# Patient Record
Sex: MALE | Race: WHITE | URBAN - METROPOLITAN AREA
[De-identification: names, ages, dates, MRNs, and addresses within clinical notes are randomized per-mention and may not be internally consistent; named-entity substitution may affect disease eponyms.]

---

## 2021-01-15 ENCOUNTER — OFFICE VISIT (OUTPATIENT)
Dept: URBAN - METROPOLITAN AREA TELEHEALTH 2 | Facility: TELEHEALTH | Age: 51
End: 2021-01-15

## 2021-01-25 ENCOUNTER — TELEPHONE ENCOUNTER (OUTPATIENT)
Dept: URBAN - METROPOLITAN AREA CLINIC 6 | Facility: CLINIC | Age: 51
End: 2021-01-25

## 2021-01-28 ENCOUNTER — LAB OUTSIDE AN ENCOUNTER (OUTPATIENT)
Dept: URBAN - METROPOLITAN AREA TELEHEALTH 2 | Facility: TELEHEALTH | Age: 51
End: 2021-01-28

## 2021-01-28 ENCOUNTER — OFFICE VISIT (OUTPATIENT)
Dept: URBAN - METROPOLITAN AREA TELEHEALTH 2 | Facility: TELEHEALTH | Age: 51
End: 2021-01-28
Payer: COMMERCIAL

## 2021-01-28 DIAGNOSIS — K70.30 ALCOHOLIC CIRRHOSIS: ICD-10-CM

## 2021-01-28 DIAGNOSIS — N17.9 AKI (ACUTE KIDNEY INJURY): ICD-10-CM

## 2021-01-28 DIAGNOSIS — R17 JAUNDICE: ICD-10-CM

## 2021-01-28 DIAGNOSIS — N17.8 OTHER ACUTE KIDNEY FAILURE: ICD-10-CM

## 2021-01-28 DIAGNOSIS — D64.9 ANEMIA: ICD-10-CM

## 2021-01-28 DIAGNOSIS — R18.8 ASCITES: ICD-10-CM

## 2021-01-28 DIAGNOSIS — Z79.899 HIGH RISK MEDICATION USE: ICD-10-CM

## 2021-01-28 DIAGNOSIS — K70.11 ALCOHOLIC HEPATITIS WITH ASCITES: ICD-10-CM

## 2021-01-28 DIAGNOSIS — D75.89 MACROCYTOSIS: ICD-10-CM

## 2021-01-28 DIAGNOSIS — Z71.89 VACCINE COUNSELING: ICD-10-CM

## 2021-01-28 DIAGNOSIS — R20.0 NUMBNESS: ICD-10-CM

## 2021-01-28 DIAGNOSIS — Z71.6 TOBACCO ABUSE COUNSELING: ICD-10-CM

## 2021-01-28 PROCEDURE — 99205 OFFICE O/P NEW HI 60 MIN: CPT

## 2021-01-28 PROCEDURE — 99255 IP/OBS CONSLTJ NEW/EST HI 80: CPT

## 2021-01-28 RX ORDER — NICOTINE 7MG/24HR
AS DIRECTED PATCH, TRANSDERMAL 24 HOURS TRANSDERMAL ONCE A DAY
Status: DISCONTINUED | COMMUNITY

## 2021-01-28 RX ORDER — FUROSEMIDE 20 MG/1
1 TABLET TABLET ORAL BID
Status: ACTIVE | COMMUNITY

## 2021-01-28 RX ORDER — POTASSIUM CHLORIDE 10 MEQ/1
1 TABLET WITH FOOD TABLET, FILM COATED, EXTENDED RELEASE ORAL ONCE A DAY
Status: ACTIVE | COMMUNITY

## 2021-01-28 RX ORDER — QUETIAPINE FUMARATE 25 MG/1
1 TABLET AT BEDTIME TABLET ORAL ONCE A DAY
Status: DISCONTINUED | COMMUNITY

## 2021-01-28 RX ORDER — SODIUM BICARBONATE TAB 650 MG 650 MG
AS DIRECTED TAB ORAL TID
Status: DISCONTINUED | COMMUNITY

## 2021-01-28 RX ORDER — PANTOPRAZOLE SODIUM 40 MG/1
1 TABLET TABLET, DELAYED RELEASE ORAL ONCE A DAY
Status: DISCONTINUED | COMMUNITY

## 2021-01-28 RX ORDER — TRAZODONE HYDROCHLORIDE 50 MG/1
1 TABLET AT BEDTIME AS NEEDED TABLET, FILM COATED ORAL ONCE A DAY
Status: DISCONTINUED | COMMUNITY

## 2021-01-28 RX ORDER — LACTULOSE 10 G/15ML
15 ML SOLUTION ORAL
Status: ACTIVE | COMMUNITY

## 2021-01-28 NOTE — HPI-OTHER HISTORIES
Patient is a 50-year-old male that we are asked to see for issues of alcoholic hepatitis and ascites and suspected cirrhosis and we are asked to see us.  Sees Dr García Lopez is seeing for his lievr disease and seeing him off and on for a while and more so seeing since nov 21 2020.  We did see some records that were sent in from [facility].  The records show that the patient had a recent admission back in August  to sept 3 2020 and was in the hospital for 16 days.  The notes were form a kidney doctor and he has not followed with them.  In his note the nephrologist:   Acute kidney injury was one of the issues that was noted in that admit along with the lacoholic hepatitis and they mentioned that not clear as to the cause of kidney issues but it appeared that the final conclusion was that the patient had tubular injury from nonsteroidal use in the setting of volume depletion secondary to poor oral intake.    Patient also had diarrhea issues that may have added to those issues.    He mentioned that he had been slowly improving with the patient having a peak creatinine of 7.5 noted at that time frame.    That note is actually from back in September 3 2020 which was when he last saw him but reference a lot of that admission back in August.  He says no apap or advil since then.  They mention that during that time of the illnes had an acute alcohol related hepatitis and had been on steroids at the time. He was in steroids in the hospital and does not recall being sent home on them so maybe he did not respond but we don't know that.  The also mentioned that the patient had alcohol-related pancreatitis as well. He says he was placed and he says he is in hospice at home and on furosemide 20mg po q 12 and kcl 10 meq 1 po qd and on lactulose 10gm/15ml 2 po qd.  Also got lorazepam as needed. B12 otc and mvi and vit b complex.  Sent home with home hospice after this and he does not remember also a lot of events and suspected.  He does not have medicare and has bcbs.  [facility] was out of network and [facility] in network.  During the admit electrolyte issues including hypovolemic hyponatremia, hypokalemia, hypocalcemia had been noted.  Metabolic acidosis also been noted as well as hypophosphatemia.  Patient also had anemia related to GI bleeding and hypomagnesemia. he not recall if scoped or not.  In that September 2020 note the renal doctor recommended to monitor the renal function and to encourage oral intake and continue sodium bicarbonate 1300 mg twice a day.  They mention to follow electrolytes.   He was after that not seen and in Nov 2020 saw primary md.  Primary MD said labs were better and kidney levels not great and watching and not clear if would get back to usual.  He does not see jaundice now.  Additional Notes within pack visit mentions that the patient had the history of the alcohol use and tobacco abuse who presented to [facility] back in August 18, 2020 complaining of yellow discoloration of the skin.  Patient had a bilirubin that was noted to be approximately 30 sodium was 127 potassium was 2.6 creatinine was 6.4.  The patient had had fatigue and dark stools that were noted.  The patient had an acute kidney injury and multiple electrolyte derangements as well.  No prior baseline creatinines were available. The patient had been using Advil 600 mg 2-3 times a day as needed for a few weeks prior to that for ankle pain.  He had been drinking heavily also for a while.   More detailed info: seen alluded to in renal note Cyanocobalamin 1000 mcg orally a day, lactulose 20 g orally once a day, multivitamin every day, nicotine patch every day, pantoprazole 40 mg a day, Seroquel 25 mg 3 times daily, Xifaxan 550 mg twice daily, 30 bicarbonate 1300 mg twice daily, trazodone 50 mg at night.  No known drug allergies were reported.  Patient was listed as weighing 165 pounds and 5 ounces back in September 3, 2020.  Weight is 167. Lowest weight 156.  On that sept visit date the patient also still had icteric sclera there was noted the mention that the abdomen was distended that the skin still appeared jaundiced and that spider angiomas were seen.  September 3, 2020 labs show sodium 143 potassium 4.2 chloride 114 CO2 17 BUN 38 creatinine down to 1.69 glucose 82 calcium 9.0 phosphorus 3.8 alk phos 149 total protein 5.5  ALT 94 bilirubin was still 25.3 magnesium was 2.0 white count was 13 hemoglobin was 9.0 hematocrit 27.3 plate count 127 MCV was 119.8.  INR was 1.29.  September 2, 2020 labs showed creatinine was 1.82 then white cell count 14.4 hemoglobin 9.3 platelet count was 115 INR was 1.35.  September 1, 2020 labs show white count 14 hemoglobin 9.2 platelet count 108. Sept 2020 labs showed sodium 141 potassium 3.5 BUN 39 creatinine 2.01 calcium 9.1 albumin 2.8.  Patient had seen Dr. Mariama Carrillo from [facility] who wrote that note that we were able to review.  August 19, 2020 ultrasound showed no significant ascites for paracentesis. This is the extent of records that we can see.  Saw renal: Cyanocobalamin 1000 mcg orally a day, lactulose 20 g orally once a day, multivitamin every day, nicotine patch every day, pantoprazole 40 mg a day, Seroquel 25 mg 3 times daily, Xifaxan 550 mg twice daily, 30 bicarbonate 13 mg twice daily, trazodone 50 mg at night.  Since the discharge the alcohol he said he went to a 5 day clinic in [facility] and has not done that.  He is still drinking on weekends on sunday and having at least 4 drinks of vodka and sprite.  Stressed that any alcohol was shown in the [facility] to be associated with poor prognosis and the best prognosis was with no alcohol.  Rules him out for oltx with [place] and [facility].  He says he does have family support and did have a divorce and lost job.  He is not working now and company not back yet.  Plan: 1. Need to get some labs to see where at. 2.  We need to encourage for abstinence so transplant may be option. 3. Need to look at rehab. 4. Many people doing remote counseling and so that is important. 5. If has ascites 50% of chance of liver failing in the next 2 yrs and kind of miracle that he survived the admit. 6. Need to get discharge summary and scope report info. 7. He will seen the nov 21 2020 labs.  Stressed to pt the need for social distancing and strict handwashing and wearing a mask and to follow any other new or added CDC recommendations as this is an evolving target.  Duration of the visit was 62 min with 20 min of time spent reviewing chart and prepping for ecw visit today and then 42 min by marjorie with the pt.

## 2021-01-28 NOTE — EXAM-PHYSICAL EXAM
Telemed self reported:  Gen: no distress. Eyes: no jaundice. Mouth: no thrush. CV: no chest pain. Resp: no wheezes. Abd: no pain. Ext: no edema.	 Neuro: no weakness. Numbness still of left foot and leg.

## 2021-02-10 ENCOUNTER — TELEPHONE ENCOUNTER (OUTPATIENT)
Dept: URBAN - METROPOLITAN AREA CLINIC 92 | Facility: CLINIC | Age: 51
End: 2021-02-10

## 2021-02-10 NOTE — HPI-OTHER HISTORIES
Dear Tru Montague,   Medical records sent in:  August 18, 2020.  Discharge summary from [facility].  Mentions that patient had been noted 3 days prior to admission to have dark stool as well as bloody stool as well and that he had history of significant alcohol use about a liter of vodka a day last used about 3 days prior.  Had rehabilitation been unsuccessful in past however.  History of withdrawal symptoms also previous reported.  Had a bilirubin over 29 and creatinine over 6.  Seen by nephrology as well as Dr. Dietrich from gastroenterology.  August 19 critically ill seen by liver team the recommended to stop the pentoxifylline.  August 22 somewhat lethargic but creatinine was trending down.  August 23 more awake kidney function improving but liver enzymes still gradually trending up.  Had elevated white cell count so they had him on ceftriaxone.  August 24 nutrition consult placed and then started on methylprednisolone per the transplant hepatology team.  August 25 on the steroids for local hepatitis.  August 27 EGD with Dobbhoff tube placement noted.  August 28 bridled EGD Dobbhoff tube counseled due to lack of bleeding and patient clinically poor.  Was encephalopathic and not responsive.  Palliative care meeting called.  August 29 mentation improved.  Psych recommended Seroquel for some reported restlessness.  September 2 bilirubin trending down but mental status waxing and waning.  September 3 discharged home with [facility] hospice.  August 18 mentions blood pressure 90/60 pulse 104 temperature 98 white cell count 12.7 hemoglobin 12 plate count 119 .6 glucose 52 BUN 39 creatinine 6.36 sodium 127 potassium 2.6 chloride 83 CO2 19 albumin 2.7  ALT 74 alkaline phosphatase 325 total bili 29.6 INR 1.51 .  Troponin less than 0.02.  August 29 white cell count 20.7 hemoglobin 9.6 platelet count 105.    August 31 white blood cell count 17.7 hemoglobin 9.7 plate count 113 .6.  August 29 sodium 140 potassium 3.2 BUN 43 creatinine 2.67.  August 31 sodium 144 potassium 4.0 BUN 43 creatinine 2.4.  August 28  ALT 50 alk phos 187 total bilirubin 38.1.  August 31  ALT 80 alk phos 181 total bilirubin 34.8.  Other labs show sodium 136 potassium 4.4 chloride 98 CO2 24 calcium 9.6 total bilirubin 1.3 creatinine 1.39 glucose 103 alk phos 78 AST 30 ALT 12.  In summary dramatic response now confirmed by records you sent.  Thank you for sending these in.  Still think we need to repeat the labs so I can do meld score and also the imaging as we discussed.  Thank you again.  Dr. Reilly

## 2021-02-18 ENCOUNTER — TELEPHONE ENCOUNTER (OUTPATIENT)
Dept: URBAN - METROPOLITAN AREA CLINIC 92 | Facility: CLINIC | Age: 51
End: 2021-02-18

## 2021-02-18 ENCOUNTER — OFFICE VISIT (OUTPATIENT)
Dept: URBAN - METROPOLITAN AREA CLINIC 91 | Facility: CLINIC | Age: 51
End: 2021-02-18
Payer: COMMERCIAL

## 2021-02-18 DIAGNOSIS — K70.30 ALCOHOLIC CIRRHOSIS OF LIVER WITHOUT ASCITES: ICD-10-CM

## 2021-02-18 DIAGNOSIS — K76.0 FATTY INFILTRATION OF LIVER: ICD-10-CM

## 2021-02-18 PROCEDURE — 93975 VASCULAR STUDY: CPT

## 2021-02-18 PROCEDURE — 76705 ECHO EXAM OF ABDOMEN: CPT

## 2021-02-18 RX ORDER — POTASSIUM CHLORIDE 10 MEQ/1
1 TABLET WITH FOOD TABLET, FILM COATED, EXTENDED RELEASE ORAL ONCE A DAY
Status: ACTIVE | COMMUNITY

## 2021-02-18 RX ORDER — LACTULOSE 10 G/15ML
15 ML SOLUTION ORAL
Status: ACTIVE | COMMUNITY

## 2021-02-18 RX ORDER — FUROSEMIDE 20 MG/1
1 TABLET TABLET ORAL BID
Status: ACTIVE | COMMUNITY

## 2021-02-18 NOTE — HPI-OTHER HISTORIES
Carito Montague, Feb 2021 u.s: liver diffusely echogenic and coarsened.  No discrete lesions.  No gallstones.  Common bile duct 3.9mm. Pancreas normal.  Right kidney 11cm and no hydronephrosis.  Spleen 10.9cm. Liver vessels patent. No ascites seen. So this was good to see.  Dr Reilly

## 2021-02-19 ENCOUNTER — TELEPHONE ENCOUNTER (OUTPATIENT)
Dept: URBAN - METROPOLITAN AREA CLINIC 92 | Facility: CLINIC | Age: 51
End: 2021-02-19

## 2021-02-19 LAB
A/G RATIO: 1.3
ALBUMIN: 4.5
ALKALINE PHOSPHATASE: 94
ALT (SGPT): 47
AST (SGOT): 65
BASO (ABSOLUTE): 0
BASOS: 0
BILIRUBIN, TOTAL: 1
BUN/CREATININE RATIO: 12
BUN: 14
CALCIUM: 9.2
CARBON DIOXIDE, TOTAL: 20
CHLORIDE: 99
CREATININE: 1.13
EGFR IF AFRICN AM: 88
EGFR IF NONAFRICN AM: 76
EOS (ABSOLUTE): 0
EOS: 0
GLOBULIN, TOTAL: 3.4
GLUCOSE: 91
HEMATOCRIT: 35.4
HEMATOLOGY COMMENTS:: (no result)
HEMOGLOBIN: 12.7
IMMATURE CELLS: (no result)
IMMATURE GRANS (ABS): 0
IMMATURE GRANULOCYTES: 0
INR: 1.1
LYMPHS (ABSOLUTE): 1.7
LYMPHS: 29
MCH: 36.1
MCHC: 35.9
MCV: 101
MONOCYTES(ABSOLUTE): 0.7
MONOCYTES: 11
NEUTROPHILS (ABSOLUTE): 3.6
NEUTROPHILS: 60
NRBC: (no result)
PLATELETS: 148
POTASSIUM: 2.9
PROTEIN, TOTAL: 7.9
PROTHROMBIN TIME: 11.3
RBC: 3.52
RDW: 12.1
SODIUM: 138
WBC: 6

## 2021-02-19 NOTE — HPI-OTHER HISTORIES
Dear Tru Montague The results of your recent tests are explained below: feb 18 wbc 6 hg 12.7 plat 148 and mcv 101 elevated. Neutrophils 3.6. tb 1.0 and alk 94 and ast 65 and alt 47 and ideal alt <35.  glu 91 and bun 14 and cr 1.13 and na 138 and k low 2.9 and you need supplements of this.  cl 99 and co2 20 and alb 4.5. inr 1.1. Meld 9 and meld na 9. Called pt re low potassium and to call office as he is on lasix and kcl and so maybe needing more and to adjust this with doctor managing this up to now.

## 2021-03-04 ENCOUNTER — OFFICE VISIT (OUTPATIENT)
Dept: URBAN - METROPOLITAN AREA TELEHEALTH 2 | Facility: TELEHEALTH | Age: 51
End: 2021-03-04
Payer: COMMERCIAL

## 2021-03-04 ENCOUNTER — LAB OUTSIDE AN ENCOUNTER (OUTPATIENT)
Dept: URBAN - METROPOLITAN AREA TELEHEALTH 2 | Facility: TELEHEALTH | Age: 51
End: 2021-03-04

## 2021-03-04 DIAGNOSIS — R18.8 ASCITES: ICD-10-CM

## 2021-03-04 DIAGNOSIS — R20.0 NUMBNESS: ICD-10-CM

## 2021-03-04 DIAGNOSIS — D64.9 ANEMIA: ICD-10-CM

## 2021-03-04 DIAGNOSIS — E87.6 HYPOKALEMIA: ICD-10-CM

## 2021-03-04 DIAGNOSIS — K70.11 ALCOHOLIC HEPATITIS WITH ASCITES: ICD-10-CM

## 2021-03-04 DIAGNOSIS — D75.89 MACROCYTOSIS: ICD-10-CM

## 2021-03-04 DIAGNOSIS — N17.9 AKI (ACUTE KIDNEY INJURY): ICD-10-CM

## 2021-03-04 DIAGNOSIS — K70.30 ALCOHOLIC CIRRHOSIS: ICD-10-CM

## 2021-03-04 DIAGNOSIS — R17 JAUNDICE: ICD-10-CM

## 2021-03-04 DIAGNOSIS — G62.9 NEUROPATHY: ICD-10-CM

## 2021-03-04 DIAGNOSIS — Z71.89 VACCINE COUNSELING: ICD-10-CM

## 2021-03-04 PROCEDURE — 99214 OFFICE O/P EST MOD 30 MIN: CPT

## 2021-03-04 RX ORDER — FUROSEMIDE 20 MG/1
1 TABLET TABLET ORAL ONCE A DAY
Qty: 30 | Refills: 0

## 2021-03-04 RX ORDER — FUROSEMIDE 20 MG/1
1 TABLET TABLET ORAL BID
Status: ACTIVE | COMMUNITY

## 2021-03-04 RX ORDER — LACTULOSE 10 G/15ML
15 ML SOLUTION ORAL
Status: ACTIVE | COMMUNITY

## 2021-03-04 RX ORDER — POTASSIUM CHLORIDE 10 MEQ/1
1 TABLET WITH FOOD TABLET, FILM COATED, EXTENDED RELEASE ORAL ONCE A DAY
Qty: 30 | Refills: 0
End: 2021-04-03

## 2021-03-04 RX ORDER — POTASSIUM CHLORIDE 10 MEQ/1
1 TABLET WITH FOOD TABLET, FILM COATED, EXTENDED RELEASE ORAL ONCE A DAY
Status: ACTIVE | COMMUNITY

## 2021-03-04 RX ORDER — LACTULOSE 10 G/15ML
15 ML SOLUTION ORAL
Qty: 900 ML | Refills: 1
End: 2021-05-03

## 2021-03-04 NOTE — HPI-OTHER HISTORIES
Patient is a 50-year-old male after prior feb 2021 visit  that we are asked to see for issues of alcoholic hepatitis and ascites and suspected cirrhosis   Feb 2021 u.s: liver diffusely echogenic and coarsened.  No discrete lesions.  No gallstones.  Common bile duct 3.9mm. Pancreas normal.  Right kidney 11cm and no hydronephrosis.  Spleen 10.9cm. Liver vessels patent. No ascites seen. So this was good to see.   Feb 18 2021 wbc 6 hg 12.7 plat 148 and mcv 101 elevated. Neutrophils 3.6. tb  1.0 and alk 94 and ast 65 and alt 47 and ideal alt less than35. glu 91 and bun 14 and cr 1.13 and na 138 and k low 2.9 and you need supplements of this. cl 99 and co2 20 and alb 4.5. inr 1.1. Meld 9 and meld na 9. Called pt re low potassium and he was off it and is back on it.  He is on lasix and kcl and maybe could get by with less.   August 18, 2020.  Discharge summary from [facility]. Mentions that patient had been noted 3 days prior to admission to have dark stool as well as bloody stool as well and that he had history of significant alcohol use about a liter of vodka a day last used about 3 days prior.  Had rehabilitation been unsuccessful in past however.  History of withdrawal symptoms also previous reported.  Had a bilirubin over 29 creatinine over 6.  Seen by nephrology as well as Dr. Dietrich from gastroenterology. August 19 critically ill seen by liver team the recommended to stop the pentoxifylline. August 22 somewhat lethargic but creatinine was trending down. August 23 more awake kidney function improving but liver enzymes still gradually trending up.  Had elevated white cell count so they had him on ceftriaxone. August 24 nutrition consult placed and then started on methylprednisolone per the transplant hepatology team. August 25 on the steroids for local hepatitis. August 27 EGD with Dobbhoff tube placement noted. August 28 bridled EGD Dobbhoff tube counseled due to lack of bleeding and patient clinically poor.  Was encephalopathic and not responsive.  Palliative care meeting called. August 29 mentation improved.  Psych recommended Seroquel for some reported restlessness. September 2 bilirubin trending down but mental status waxing and waning.  September 3 discharged home with detox hospice. August 18 mentions blood pressure 90/60 pulse 104 temperature 98 white cell count 12.7 hemoglobin 12 plate count 119 .6 glucose 52 BUN 39 creatinine 6.36 sodium 127 potassium 2.6 chloride 83 CO2 19 albumin 2.7  ALT 74 alkaline phosphatase 325 total bili 29.6 INR 1.51 .  Troponin less than 0.02. August 29 white cell count 20.7 hemoglobin 9.6 platelet count 105.   August 31 white blood cell count 17.7 hemoglobin 9.7 plate count 113 .6. August 29 sodium 140 potassium 3.2 BUN 43 creatinine 2.67. August 31 sodium 144 potassium 4.0 BUN 43 creatinine 2.4. August 28  ALT 50 alk phos 187 total bilirubin 38.1. August 31  ALT 80 alk phos 181 total bilirubin 34.8. Other labs show sodium 136 potassium 4.4 chloride 98 CO2 24 calcium 9.6 total bilirubin 1.3 creatinine 1.39 glucose 103 alk phos 78 AST 30 ALT 12.  Sees Dr García Lopez is seeing for his liver disease and seeing him off and on for a while and more so seeing since nov 21 2020 and last visit with him.  He needs to let him know that he is better,  The records show that the patient had a recent admission back in August  to sept 3 2020 and was in the hospital for 16 days.  The notes were form a kidney doctor and he has not followed with them.  In his note the nephrologist:  Acute kidney injury was one of the issues that was noted in that admit along with the alcoholic hepatitis and they mentioned that not clear as to the cause of kidney issues but it appeared that the final conclusion was that the patient had tubular injury from nonsteroidal use in the setting of volume depletion secondary to poor oral intake.    Patient also had diarrhea issues that may have added to those issues.    He mentioned that he had been slowly improving with the patient having a peak creatinine of 7.5 noted at that time frame.    That note is actually from back in September 3 2020 which was when he last saw him but reference a lot of that admission back in August.  He says no apap or advil since then.  They mention that during that time of the illnes had an acute alcohol related hepatitis and had been on steroids at the time. He was in steroids in the hospital and does not recall being sent home on them so maybe he did not respond but we don't know that.  The also mentioned that the patient had alcohol-related pancreatitis as well. He says he was placed and he says he is in hospice at home and on furosemide 20mg po q 12 and kcl 10 meq 1 po qd and on lactulose 10gm/15ml 2 po qd.  Also got lorazepam as needed. B12 otc and mvi and vit b complex.  Sent home with home hospice after this and he does not remember also a lot of events and suspected.  He has bcbs.  [facility] was out of network and [facility] in network.  During the admission, nephrology electrolyte issues including hypovolemic hyponatremia, hypokalemia, hypocalcemia had been noted.  Metabolic acidosis also been noted as well as hypophosphatemia. Main issue now is potassium.  Patient also had anemia related to GI bleeding and hypomagnesemia. he not recall if scoped or not.  Need to refer to someone for his his needed egd and colonoscopy. He [facility] to [facility].  September 2020 note the renal doctor recommended to monitor the renal function and to encourage oral intake and continue sodium bicarbonate 1300 mg twice a day.  They mention to follow electrolytes.   He was after that not seen and in Nov 2020 saw primary md.  Primary MD said labs were better and kidney levels not great  at that time but was late last year and he wanted him to see a specialist.  Cr 1.13.  He does not see jaundice now and the labs supported that.  Additional Notes within pack visit mentions that the patient had the history of the alcohol use and tobacco abuse who presented to [facility] back in August 18, 2020 complaining of yellow discoloration of the skin.  Patient had a bilirubin that was noted to be approximately 30 sodium was 127 potassium was 2.6 creatinine was 6.4.  The patient had had fatigue and dark stools that were noted.  The patient had an acute kidney injury and multiple electrolyte derangements as well.  No prior baseline creatinines were available. The patient had been using Advil 600 mg 2-3 times a day as needed for a few weeks prior to that for ankle pain.  He had been drinking heavily also for a while.   More detailed info: seen alluded to in renal note Cyanocobalamin 1000 mcg orally a day, lactulose 20 g orally once a day, multivitamin every day, nicotine patch every day, pantoprazole 40 mg a day, Seroquel 25 mg 3 times daily, Xifaxan 550 mg twice daily, 30 bicarbonate 1300 mg twice daily, trazodone 50 mg at night.  No known drug allergies were reported.  Patient was listed as weighing 165 pounds and 5 ounces back in September 3, 2020.  Weight is 167. Lowest weight 156.  Weight now if 165 and no fluid by u/s.  On that sept 2020 visit date the patient also still had icteric sclera there was noted the mention that the abdomen was distended that the skin still appeared jaundiced and that spider angiomas were seen.  September 3, 2020 labs show sodium 143 potassium 4.2 chloride 114 CO2 17 BUN 38 creatinine down to 1.69 glucose 82 calcium 9.0 phosphorus 3.8 alk phos 149 total protein 5.5  ALT 94 bilirubin was still 25.3 magnesium was 2.0 white count was 13 hemoglobin was 9.0 hematocrit 27.3 plate count 127 MCV was 119.8.  INR was 1.29.  September 2, 2020 labs showed creatinine was 1.82 then white cell count 14.4 hemoglobin 9.3 platelet count was 115 INR was 1.35.  September 1, 2020 labs show white count 14 hemoglobin 9.2 platelet count 108. Sept 2020 labs showed sodium 141 potassium 3.5 BUN 39 creatinine 2.01 calcium 9.1 albumin 2.8.  Patient had seen Dr. Mariama Carrillo from [facility] who wrote that note that we were able to review. Encouraged to see.  August 19, 2020 ultrasound showed no significant ascites for paracentesis.  Still having 2 drinks on weekends and stressed needs counseling. Still having the rinks of vodka and sprite.  Stressed that any alcohol was shown in the [facility] to be associated with poor prognosis and the best prognosis was with no alcohol.  It also rules him out for oltx with [place] and [facility].  He says he does have family support and did have a divorce and lost job. Need to get counseling.  He is not working now and company not back yet.  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 Usually see neurology use neurotin for this.   Plan: 1. Need to get back with primary md and need to get the diuretics cut back as no fluid now. 2. Needs to look at tx for possibel neuropathy and see primary md or neurologist for this. 3.  We need to encourage for abstinence so if and when a transplant needed would be option. 4. Need to look at rehab counseling for the addiciton. 5. Need to get off the occ alcohol. 6. If off it meld score maybe better. 7. Not sure nephrology needs to see as better but if did see someone should see one he saw prior. 8. Plan to see in 3m and see how doing. 9. Need gen gi referral for egd and colon.  Stressed to pt the need for social distancing and strict handwashing and wearing a mask and to follow any other new or added CDC recommendations as this is an evolving target.  Duration of the visit was 36 min  via [facility] video in discussion with the pt and in reviewing the recent tests.

## 2021-03-22 ENCOUNTER — OFFICE VISIT (OUTPATIENT)
Dept: URBAN - METROPOLITAN AREA TELEHEALTH 2 | Facility: TELEHEALTH | Age: 51
End: 2021-03-22
Payer: COMMERCIAL

## 2021-03-22 DIAGNOSIS — Z12.11 COLON CANCER SCREENING: ICD-10-CM

## 2021-03-22 DIAGNOSIS — K70.31 ALCOHOLIC CIRRHOSIS OF LIVER WITH ASCITES: ICD-10-CM

## 2021-03-22 PROBLEM — 420054005: Status: ACTIVE | Noted: 2021-03-22

## 2021-03-22 PROCEDURE — 99213 OFFICE O/P EST LOW 20 MIN: CPT | Performed by: INTERNAL MEDICINE

## 2021-03-22 RX ORDER — FUROSEMIDE 20 MG/1
1 TABLET TABLET ORAL ONCE A DAY
Qty: 30 | Refills: 0 | Status: ACTIVE | COMMUNITY

## 2021-03-22 RX ORDER — LACTULOSE 10 G/15ML
15 ML SOLUTION ORAL
Qty: 900 ML | Refills: 1 | Status: ACTIVE | COMMUNITY
End: 2021-05-03

## 2021-03-22 RX ORDER — POTASSIUM CHLORIDE 10 MEQ/1
1 TABLET WITH FOOD TABLET, FILM COATED, EXTENDED RELEASE ORAL ONCE A DAY
Qty: 30 | Refills: 0 | Status: ACTIVE | COMMUNITY
End: 2021-04-03

## 2021-03-31 ENCOUNTER — ERX REFILL RESPONSE (OUTPATIENT)
Dept: URBAN - METROPOLITAN AREA CLINIC 86 | Facility: CLINIC | Age: 51
End: 2021-03-31

## 2021-03-31 RX ORDER — LACTULOSE 10 G/15ML
15 ML SOLUTION ORAL
Qty: 900 | Refills: 1

## 2021-04-12 ENCOUNTER — LAB OUTSIDE AN ENCOUNTER (OUTPATIENT)
Dept: URBAN - METROPOLITAN AREA TELEHEALTH 2 | Facility: TELEHEALTH | Age: 51
End: 2021-04-12

## 2021-05-09 ENCOUNTER — ERX REFILL RESPONSE (OUTPATIENT)
Dept: URBAN - METROPOLITAN AREA CLINIC 86 | Facility: CLINIC | Age: 51
End: 2021-05-09

## 2021-05-09 RX ORDER — FUROSEMIDE 20 MG/1
1 TABLET TABLET ORAL ONCE A DAY
Qty: 30 | Refills: 0

## 2021-05-27 ENCOUNTER — OFFICE VISIT (OUTPATIENT)
Dept: URBAN - METROPOLITAN AREA TELEHEALTH 2 | Facility: TELEHEALTH | Age: 51
End: 2021-05-27

## 2021-05-27 RX ORDER — FUROSEMIDE 20 MG/1
1 TABLET TABLET ORAL ONCE A DAY
Qty: 30 | Refills: 0

## 2021-05-27 NOTE — HPI-OTHER HISTORIES
Patient is a 50-year-old male after prior [facility] 2021 visit  that we are asked to see for issues of alcoholic hepatitis and ascites and suspected cirrhosis   Feb 2021 u.s: liver diffusely echogenic and coarsened.  No discrete lesions.  No gallstones.  Common bile duct 3.9mm. Pancreas normal.  Right kidney 11cm and no hydronephrosis.  Spleen 10.9cm. Liver vessels patent. No ascites seen. So this was good to see.   Feb 18 2021 wbc 6 hg 12.7 plat 148 and mcv 101 elevated. Neutrophils 3.6. tb  1.0 and alk 94 and ast 65 and alt 47 and ideal alt less than35. glu 91 and bun 14 and cr 1.13 and na 138 and k low 2.9 and you need supplements of this. cl 99 and co2 20 and alb 4.5. inr 1.1. Meld 9 and meld na 9. Called pt re low potassium and he was off it and is back on it.  He is on lasix and kcl and maybe could get by with less.   August 18, 2020.  Discharge summary from [facility]. Mentions that patient had been noted 3 days prior to admission to have dark stool as well as bloody stool as well and that he had history of significant alcohol use about a liter of vodka a day last used about 3 days prior.  Had rehabilitation been unsuccessful in past however.  History of withdrawal symptoms also previous reported.  Had a bilirubin over 29 creatinine over 6.  Seen by nephrology as well as Dr. Dietrich from gastroenterology. August 19 critically ill seen by liver team the recommended to stop the pentoxifylline. August 22 somewhat lethargic but creatinine was trending down. August 23 more awake kidney function improving but liver enzymes still gradually trending up.  Had elevated white cell count so they had him on ceftriaxone. August 24 nutrition consult placed and then started on methylprednisolone per the transplant hepatology team. August 25 on the steroids for local hepatitis. August 27 EGD with Dobbhoff tube placement noted. August 28 bridled EGD Dobbhoff tube counseled due to lack of bleeding and patient clinically poor.  Was encephalopathic and not responsive.  Palliative care meeting called. August 29 mentation improved.  Psych recommended Seroquel for some reported restlessness. September 2 bilirubin trending down but mental status waxing and waning.  September 3 discharged home with detox hospice. August 18 mentions blood pressure 90/60 pulse 104 temperature 98 white cell count 12.7 hemoglobin 12 plate count 119 .6 glucose 52 BUN 39 creatinine 6.36 sodium 127 potassium 2.6 chloride 83 CO2 19 albumin 2.7  ALT 74 alkaline phosphatase 325 total bili 29.6 INR 1.51 .  Troponin less than 0.02. August 29 white cell count 20.7 hemoglobin 9.6 platelet count 105.   August 31 white blood cell count 17.7 hemoglobin 9.7 plate count 113 .6. August 29 sodium 140 potassium 3.2 BUN 43 creatinine 2.67. August 31 sodium 144 potassium 4.0 BUN 43 creatinine 2.4. August 28  ALT 50 alk phos 187 total bilirubin 38.1. August 31  ALT 80 alk phos 181 total bilirubin 34.8. Other labs show sodium 136 potassium 4.4 chloride 98 CO2 24 calcium 9.6 total bilirubin 1.3 creatinine 1.39 glucose 103 alk phos 78 AST 30 ALT 12.  Sees Dr García Lopez is seeing for his liver disease and seeing him off and on for a while and more so seeing since nov 21 2020 and last visit with him.  He needs to let him know that he is better,  The records show that the patient had a recent admission back in August  to sept 3 2020 and was in the hospital for 16 days.  The notes were form a kidney doctor and he has not followed with them.  In his note the nephrologist:  Acute kidney injury was one of the issues that was noted in that admit along with the alcoholic hepatitis and they mentioned that not clear as to the cause of kidney issues but it appeared that the final conclusion was that the patient had tubular injury from nonsteroidal use in the setting of volume depletion secondary to poor oral intake.    Patient also had diarrhea issues that may have added to those issues.    He mentioned that he had been slowly improving with the patient having a peak creatinine of 7.5 noted at that time frame.    That note is actually from back in September 3 2020 which was when he last saw him but reference a lot of that admission back in August.  He says no apap or advil since then.  They mention that during that time of the illnes had an acute alcohol related hepatitis and had been on steroids at the time. He was in steroids in the hospital and does not recall being sent home on them so maybe he did not respond but we don't know that.  The also mentioned that the patient had alcohol-related pancreatitis as well. He says he was placed and he says he is in hospice at home and on furosemide 20mg po q 12 and kcl 10 meq 1 po qd and on lactulose 10gm/15ml 2 po qd.  Also got lorazepam as needed. B12 otc and mvi and vit b complex.  Sent home with home hospice after this and he does not remember also a lot of events and suspected.  He has bcbs.  [facility] was out of network and [facility] in network.  During the admission, nephrology electrolyte issues including hypovolemic hyponatremia, hypokalemia, hypocalcemia had been noted.  Metabolic acidosis also been noted as well as hypophosphatemia. Main issue now is potassium.  Patient also had anemia related to GI bleeding and hypomagnesemia. he not recall if scoped or not.  Need to refer to someone for his his needed egd and colonoscopy. He lives close to [facility].  September 2020 note the renal doctor recommended to monitor the renal function and to encourage oral intake and continue sodium bicarbonate 1300 mg twice a day.  They mention to follow electrolytes.   He was after that not seen and in Nov 2020 saw primary md.  Primary MD said labs were better and kidney levels not great  at that time but was late last year and he wanted him to see a specialist.  Cr 1.13.  He does not see jaundice now and the labs supported that.  Additional Notes within pack visit mentions that the patient had the history of the alcohol use and tobacco abuse who presented to [facility] back in August 18, 2020 complaining of yellow discoloration of the skin.  Patient had a bilirubin that was noted to be approximately 30 sodium was 127 potassium was 2.6 creatinine was 6.4.  The patient had had fatigue and dark stools that were noted.  The patient had an acute kidney injury and multiple electrolyte derangements as well.  No prior baseline creatinines were available. The patient had been using Advil 600 mg 2-3 times a day as needed for a few weeks prior to that for ankle pain.  He had been drinking heavily also for a while.   More detailed info: seen alluded to in renal note Cyanocobalamin 1000 mcg orally a day, lactulose 20 g orally once a day, multivitamin every day, nicotine patch every day, pantoprazole 40 mg a day, Seroquel 25 mg 3 times daily, Xifaxan 550 mg twice daily, 30 bicarbonate 1300 mg twice daily, trazodone 50 mg at night.  No known drug allergies were reported.  Patient was listed as weighing 165 pounds and 5 ounces back in September 3, 2020.  Weight is 167. Lowest weight 156.  Weight now if 165 and no fluid by u/s.  On that sept 2020 visit date the patient also still had icteric sclera there was noted the mention that the abdomen was distended that the skin still appeared jaundiced and that spider angiomas were seen.  September 3, 2020 labs show sodium 143 potassium 4.2 chloride 114 CO2 17 BUN 38 creatinine down to 1.69 glucose 82 calcium 9.0 phosphorus 3.8 alk phos 149 total protein 5.5  ALT 94 bilirubin was still 25.3 magnesium was 2.0 white count was 13 hemoglobin was 9.0 hematocrit 27.3 plate count 127 MCV was 119.8.  INR was 1.29.  September 2, 2020 labs showed creatinine was 1.82 then white cell count 14.4 hemoglobin 9.3 platelet count was 115 INR was 1.35.  September 1, 2020 labs show white count 14 hemoglobin 9.2 platelet count 108. Sept 2020 labs showed sodium 141 potassium 3.5 BUN 39 creatinine 2.01 calcium 9.1 albumin 2.8.  Patient had seen Dr. Mariama Carrillo from [facility] internal medicine who wrote that note that we were able to review. Encouraged to see.  August 19, 2020 ultrasound showed no significant ascites for paracentesis.  Still having 2 drinks on weekends and stressed needs counseling. Still having the rinks of vodka and sprite.  Stressed that any alcohol was shown in the [facility] to be associated with poor prognosis and the best prognosis was with no alcohol.  It also rules him out for oltx with [facility] and [facility].  He says he does have family support and did have a divorce and lost job. Need to get counseling.  He is not working now and company not back yet.  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 Usually see neurology use neurotin for this.   Plan: 1. Need to get back with primary md and need to get the diuretics cut back as no fluid now. 2. Needs to look at tx for possibel neuropathy and see primary md or neurologist for this. 3.  We need to encourage for abstinence so if and when a transplant needed would be option. 4. Need to look at rehab counseling for the addiciton. 5. Need to get off the occ alcohol. 6. If off it meld score maybe better. 7. Not sure nephrology needs to see as better but if did see someone should see one he saw prior. 8. Plan to see in 3m and see how doing. 9. Need gen gi referral for egd and colon.  Stressed to pt the need for social distancing and strict handwashing and wearing a mask and to follow any other new or added CDC recommendations as this is an evolving target.  Duration of the visit was  min  via [employer] video in discussion with the pt and in reviewing the recent tests.

## 2021-10-31 ENCOUNTER — P2P PATIENT RECORD (OUTPATIENT)
Age: 51
End: 2021-10-31

## 2023-02-27 ENCOUNTER — WEB ENCOUNTER (OUTPATIENT)
Dept: URBAN - METROPOLITAN AREA CLINIC 86 | Facility: CLINIC | Age: 53
End: 2023-02-27

## 2023-02-27 ENCOUNTER — TELEPHONE ENCOUNTER (OUTPATIENT)
Dept: URBAN - METROPOLITAN AREA CLINIC 86 | Facility: CLINIC | Age: 53
End: 2023-02-27

## 2023-02-27 ENCOUNTER — OFFICE VISIT (OUTPATIENT)
Dept: URBAN - METROPOLITAN AREA CLINIC 86 | Facility: CLINIC | Age: 53
End: 2023-02-27
Payer: COMMERCIAL

## 2023-02-27 VITALS
HEART RATE: 122 BPM | SYSTOLIC BLOOD PRESSURE: 81 MMHG | WEIGHT: 120 LBS | HEIGHT: 65 IN | DIASTOLIC BLOOD PRESSURE: 68 MMHG | BODY MASS INDEX: 19.99 KG/M2 | TEMPERATURE: 97.6 F

## 2023-02-27 DIAGNOSIS — R18.8 ASCITES: ICD-10-CM

## 2023-02-27 DIAGNOSIS — Z79.899 HIGH RISK MEDICATION USE: ICD-10-CM

## 2023-02-27 DIAGNOSIS — K70.30 ALCOHOLIC CIRRHOSIS: ICD-10-CM

## 2023-02-27 PROBLEM — 9953008 ACUTE ALCOHOLIC LIVER DISEASE: Status: ACTIVE | Noted: 2021-01-28

## 2023-02-27 PROBLEM — 409063005 COUNSELING: Status: ACTIVE | Noted: 2021-01-28

## 2023-02-27 PROBLEM — 389026000 ASCITES: Status: ACTIVE | Noted: 2021-01-28

## 2023-02-27 PROBLEM — 443913008: Status: ACTIVE | Noted: 2021-01-28

## 2023-02-27 PROBLEM — 17920008: Status: ACTIVE | Noted: 2023-02-27

## 2023-02-27 PROBLEM — 44077006: Status: ACTIVE | Noted: 2021-01-28

## 2023-02-27 PROBLEM — 420054005 ALCOHOLIC CIRRHOSIS: Status: ACTIVE | Noted: 2021-01-28

## 2023-02-27 PROBLEM — 161646004 H/O: HIGH RISK MEDICATION: Status: ACTIVE | Noted: 2021-01-28

## 2023-02-27 PROBLEM — 386033004: Status: ACTIVE | Noted: 2021-03-04

## 2023-02-27 PROBLEM — 271737000 ANEMIA: Status: ACTIVE | Noted: 2021-01-28

## 2023-02-27 PROBLEM — 14669001 ACUTE RENAL FAILURE SYNDROME: Status: ACTIVE | Noted: 2021-01-28

## 2023-02-27 PROBLEM — 14534009: Status: ACTIVE | Noted: 2023-02-27

## 2023-02-27 PROBLEM — 18165001 JAUNDICE: Status: ACTIVE | Noted: 2021-01-28

## 2023-02-27 PROBLEM — 43339004: Status: ACTIVE | Noted: 2021-03-04

## 2023-02-27 PROBLEM — 397073000 MACROCYTOSIS: Status: ACTIVE | Noted: 2021-01-28

## 2023-02-27 PROBLEM — 111374002: Status: ACTIVE | Noted: 2023-02-27

## 2023-02-27 PROCEDURE — 99214 OFFICE O/P EST MOD 30 MIN: CPT | Performed by: PHYSICIAN ASSISTANT

## 2023-02-27 RX ORDER — FUROSEMIDE 20 MG/1
1 TABLET TABLET ORAL ONCE A DAY
Qty: 30 | Refills: 0 | Status: ON HOLD | COMMUNITY

## 2023-02-27 RX ORDER — LACTULOSE 10 G/15ML
15 ML SOLUTION ORAL
Qty: 900 | Refills: 1 | Status: ON HOLD | COMMUNITY

## 2023-02-27 NOTE — HPI-OTHER HISTORIES
Patient is a 52-year-old male after prior visit in 2021 that we are asked to see for issues of alcoholic hepatitis and ascites and suspected cirrhosis. Lost to follow up after last visit  A copy of the note will be sent to the referring provider.  2/27/23 visit, lost to follow up in 2021 and this is my first time seeing this patient. He is having issues with his pancreatitis and pain and difficulty eating. Also notes difficulty with balance. Loosing a alot of weight due to not being able to eat. He went to the ER and had labs done and:  2/19/23 inr 1.3, cr 0.7, k 4.0, sodium 136, ast 54, alt 33, lipase 176 bili 0.6 alp 191 done at ER visit .previously 12/28/23 alp 371, ast 136, alt 57. bili 1. na 137 k 3.6 . ammonia 110 (venous) MELD 9 His PCP ordered a ct scan and was having issues pulling the report so did not have until end of visit.  1/3/23CT ABDOMEN PELVIS WITH CONTRAST AHI  CLINICAL INDICATION: Weight loss, diarrhea.  TECHNIQUE: Following IV administration of 100 mL Omnipaque 350, CT scan of the abdomen and pelvis with multiplanar reformatted images generated from the data set. Dose reduction techniques were utilized.  CONTRAST: CT Contrast Omnipaque 350 per mL 100  COMPARISON: No comparison studies are available at this time.  FINDINGS:  Centrilobular emphysema. 6 mm nodule in the basilar right lower lobe (series 6 image 14).  ABDOMEN: Nonocclusive thrombus in the main portal vein and splenic vein (series 2 image 38). Inflammatory stranding around the pancreas consistent with recent pancreatitis. Small to moderate amount of free fluid prominently in the right upper quadrant and pelvis. Multiple focal fluid collections in the upper abdomen including a 6.0 x 3.5 cm collection adjacent the hepatic hilum (series 2 image 23), a 4.0 x 2.3 cm collection along the greater curvature of the stomach (series 2 image 25), and a 2.1 x 1.6 cm collection adjacent the pancreatic tail (series 2 image 30). Multiple other small low-density areas scattered throughout the pancreas may represent focal dilatation of the pancreatic duct.  Diffuse hepatic steatosis. Normal spleen. Normal adrenals and kidneys. No hydronephrosis. No bowel obstruction or free air. Mild diverticulosis of the descending and sigmoid colon without acute diverticulitis. No abdominal aortic aneurysm.  PELVIS: Decompressed bladder. Normal sized prostate and seminal vesicles. No deep pelvic or inguinal lymphadenopathy.  No aggressive osseous lesion.   IMPRESSION:  Multiple acute peripancreatic fluid collections versus pseudocysts in the upper abdomen. Differentiation between these two etiologies depends on time since pancreatitis onset.  Small to moderate amount of free fluid.  Nonocclusive thrombus in the main portal vein and splenic vein.  Diffuse hepatic steatosis.  Emphysema.  6 mm right lower lobe nodule. Per Fleischner Society guidelines recommend 12 month follow-up CT chest.  Discussed his hx of cirrhosis and CT not seeing this and we will get an MRI to get a better look at this and also need better look at the clot and the issues with the pancreas. He has been off the ETOH x 2 months and encouraged this, recommend counseling as well.  Discussed need for hematology evaluation and we will refer him for this. Needs to see if canidate for anticoagulation    RECAP Pt with hx of etoh cirrhosis and hepatitis with hospitalization for this in 2020 at [place] and at the time bilirubin of over 29 and cr of 6. managed In the hospital by the otx team. he was there for 16 days and at the time also with pancreatitis and was placed on home hospice until eventually recovered. GI bleed at the time as well. He was seen here in 2021 and at the time kidneys had recovered and unforunately he continued the etoh

## 2023-03-01 ENCOUNTER — TELEPHONE ENCOUNTER (OUTPATIENT)
Dept: URBAN - METROPOLITAN AREA CLINIC 86 | Facility: CLINIC | Age: 53
End: 2023-03-01

## 2023-03-01 LAB
A/G RATIO: 0.6
ABSOLUTE BASOPHILS: 32
ABSOLUTE EOSINOPHILS: 21
ABSOLUTE LYMPHOCYTES: 3721
ABSOLUTE MONOCYTES: 636
ABSOLUTE NEUTROPHILS: 6190
ALBUMIN: 2.6
ALKALINE PHOSPHATASE: 232
ALT (SGPT): 21
AMMONIA (P): 71
AST (SGOT): 35
BASOPHILS: 0.3
BILIRUBIN, TOTAL: 0.9
BUN/CREATININE RATIO: 14
BUN: 10
CALCIUM: 7.9
CARBON DIOXIDE, TOTAL: 19
CHLORIDE: 105
CREATININE: 0.69
EGFR: 113
EOSINOPHILS: 0.2
GLOBULIN, TOTAL: 4.2
GLUCOSE: 93
HEMATOCRIT: 32.2
HEMOGLOBIN: 11.3
INR: 1.2
LYMPHOCYTES: 35.1
MCH: 34.3
MCHC: 35.1
MCV: 97.9
MONOCYTES: 6
MPV: 11.5
NEUTROPHILS: 58.4
PLATELET COUNT: 291
POTASSIUM: 3
PROTEIN, TOTAL: 6.8
PT: 12.2
RDW: 12.5
RED BLOOD CELL COUNT: 3.29
SODIUM: 139
WHITE BLOOD CELL COUNT: 10.6

## 2023-03-01 NOTE — HPI-TODAY'S VISIT:
Dear Tru Montague,  The 2/27/23 Labs were sent to me. It appears these were done at [facility] and not [facility] therefore the ammonia level is not accurate, value 71 which is normal but not arterial blood draw. The glucose 93, creatinine .69, sodium 139, potassium 3.0, calcium 7.9, albumin 2.6, these values are all low. The bilirubin is 0.9. The alkaline phosphatase is elevated at 232. The ast 35, alt 21. The goal for ast/alt is less than 35. The inr is 1.2.MELD score 8.  I would recommend eating 2 banannas a day for the next 3 days and having your primary care recheck the potassium. Next meeting we can fractionate the alkaline phosphatase and see where the elevation is primarily coming from. Low vitamin d can effect this.   We will review again at the follow up. Please share this information with your primary care.   Stephanie Plaza PA-C

## 2023-03-06 ENCOUNTER — LAB OUTSIDE AN ENCOUNTER (OUTPATIENT)
Dept: URBAN - METROPOLITAN AREA CLINIC 86 | Facility: CLINIC | Age: 53
End: 2023-03-06

## 2023-03-16 ENCOUNTER — TELEPHONE ENCOUNTER (OUTPATIENT)
Dept: URBAN - METROPOLITAN AREA CLINIC 86 | Facility: CLINIC | Age: 53
End: 2023-03-16

## 2023-03-16 NOTE — HPI-TODAY'S VISIT:
Th3/10/23 MRI  The liver is showing fatty liver and no aggressive lesions.  The gallbladder is unremarkable.  There is no significant biliary ductal dilatation.  The spleen is normal in size and shows no focal abnormalities.  Pancreas with loss of the normal pancreatic architecture with heterogeneous signal on precontrast T1-weighted imaging.  There are foci of T1 hyperintense signal suspicious for blood products.  There is irregular beaded dilation of the pancreatic duct with significant atrophy of the distal body and tail.  There are multiple nonenhancing collections coursing throughout the Giovani pancreatic space and lesser sac 7 which contains heterogeneous signal and mild internal complexity.  The largest collection extends posteriorly and superiorly from the pancreatic body into the gastrohepatic space measuring approximately 5.8 cm in craniocaudal length and 2.8 cm in transverse oblique dimension.  And additional collection arising from the posterior aspect of the pancreatic body and extending to the left of the midline tail measuring approximately 4 x 2 cm.  More inferiorly along the pancreatic Uticap process there is a collection measuring 2.1 cm.  There is continued.  Pancreatic stranding.  It is unclear whether of these communicate with the main duct specifically at the level of the body.  Although difficult to accurately compare to prior exam due to difference in modality the predominant findings are grossly unchanged.  There may be decreased free fluid and stranding compared to that exam.  Specifically along the greater curvature of the stomach and surrounding the liver.  Adrenals unremarkable, kidneys unremarkable.  Vascular showing a thrombus in the extrahepatic portal vein and extending to the portal confluence and splenic vein.  No arterial vesicular findings.  No aneurysmal dilatation.  Overall they thought it was difficult to compare to the CT scan but they felt there was decreased active inflammation and free fluid with persistent multiple loculated pancreatic fluid collections and they felt these could be pseudocyst.  They saw severe atrophy and diffuse irregular beaded appearance of the main duct in the region of the body and the tail which they thought could be a sequela of pancreatitis.  It is difficult to exclude ductal disruption in communication with the fluid on this exam.  Persistent nonocclusive thrombus in the extrahepatic portal vein, confluence, and splenic vein.  Hepatic steatosis.  At your visit we referred you to Dr. Sobeida Sandoval for the clot. Please let us know if you have not made an appointment. This does not appear to show cirrhosis and you may not need the endoscopy but still need to consult GI about the pancreas issues and inability to eat. We will review at the follow up.  Stephanie Plaza PA-C

## 2023-03-27 ENCOUNTER — OFFICE VISIT (OUTPATIENT)
Dept: URBAN - METROPOLITAN AREA CLINIC 92 | Facility: CLINIC | Age: 53
End: 2023-03-27
Payer: COMMERCIAL

## 2023-03-27 ENCOUNTER — LAB OUTSIDE AN ENCOUNTER (OUTPATIENT)
Dept: URBAN - METROPOLITAN AREA CLINIC 86 | Facility: CLINIC | Age: 53
End: 2023-03-27

## 2023-03-27 VITALS
BODY MASS INDEX: 18.49 KG/M2 | SYSTOLIC BLOOD PRESSURE: 98 MMHG | HEIGHT: 65 IN | TEMPERATURE: 96.6 F | DIASTOLIC BLOOD PRESSURE: 73 MMHG | WEIGHT: 111 LBS

## 2023-03-27 DIAGNOSIS — K86.0 ALCOHOL-INDUCED CHRONIC PANCREATITIS: ICD-10-CM

## 2023-03-27 DIAGNOSIS — K86.3 PANCREATIC PSEUDOCYST: ICD-10-CM

## 2023-03-27 DIAGNOSIS — R63.0 LOSS OF APPETITE: ICD-10-CM

## 2023-03-27 DIAGNOSIS — R06.02 SHORTNESS OF BREATH: ICD-10-CM

## 2023-03-27 DIAGNOSIS — R00.0 TACHYCARDIA: ICD-10-CM

## 2023-03-27 DIAGNOSIS — R63.4 WEIGHT LOSS: ICD-10-CM

## 2023-03-27 PROBLEM — 79890006: Status: ACTIVE | Noted: 2023-03-27

## 2023-03-27 PROBLEM — 235952002: Status: ACTIVE | Noted: 2023-03-27

## 2023-03-27 PROCEDURE — 99204 OFFICE O/P NEW MOD 45 MIN: CPT

## 2023-03-27 NOTE — PHYSICAL EXAM GASTROINTESTINAL
Abdomen,  soft, epigastrium tenderness, , nondistended,  no guarding or rigidity,  no masses palpable,  normal bowel sounds Liver and Spleen,no hepatosplenomegaly

## 2023-03-27 NOTE — HPI-TODAY'S VISIT:
52-year-old male presents today for EGD, colonoscopy consult as well as pancreatitis.  He was referred to us by the liver center.  Patient has a history of alcoholic cirrhosis and was lost to follow-up for 2 years.  He saw Stephanie February 2023 and was complaining of abdominal pain and difficulty eating. He went to [facility] ER feb 2023 and had labs done that demonstrated AST 54 ALT 33 lipase 176 bili 0.6 he had a CT scan on 1-3-2023 that demonstrated multiple acute peripancreatic fluid collections versus pseudocyst in the upper abdomen differentiation between these 2 etiologies depends on time since pancreatitis onset a MRI was obtained on 3- that demonstrated although difficult to accurately compare due to differences in modality there is decreased active inflammation and free fluid with persistent multiple multiloculated peripancreatic fluid collections with minimal internal complexity likely pseudocyst.  There is severe atrophy and diffuse, irregular beaded appearance of the main pancreatic duct in the region of the body and tail.  This is likely a sequelae of pancreatitis.  It is difficult to exclude ductal disruption in communication with fluid collections on this exam.  There is persistent nonocclusive thrombosis in the extrahepatic portal vein, confluence and splenic vein  Today he presents with his father.  He states he has not been able to tolerate any solids or liquids has been vomiting daily no hematemesis.  His father states he went from being 160 to 110 during the course of 3 months.  Recently he has lost about 10 pounds in a month.  He states he has no appetite.  He also has generalized abdominal pain worse in the epigastric area.  Laying down makes the pain feel better.  He is not drinking any alcohol currently but he is still smoking half a pack of cigarettes a day.  He does have issues with acid reflux but he takes omeprazole daily.  He denies any dysphagia or odynophagia.  He has never had an EGD before.  He is also having diarrhea and sometimes will have loose watery stool every 2 hours.  He is not on any enzymes currently. He also notes increased shortness of breath recently and heart palpitations. Father is very concerned about his inability to tolerate anything by mouth and is thinking of taking pt to [place] where he lives to take care of him.

## 2023-03-28 ENCOUNTER — OUT OF OFFICE VISIT (OUTPATIENT)
Dept: URBAN - METROPOLITAN AREA MEDICAL CENTER 12 | Facility: MEDICAL CENTER | Age: 53
End: 2023-03-28
Payer: COMMERCIAL

## 2023-03-28 DIAGNOSIS — K85.81 OTHER ACUTE PANCREATITIS WITH UNINFECTED NECROSIS: ICD-10-CM

## 2023-03-28 DIAGNOSIS — K86.2 CYST OF PANCREAS: ICD-10-CM

## 2023-03-28 PROCEDURE — 99232 SBSQ HOSP IP/OBS MODERATE 35: CPT | Performed by: INTERNAL MEDICINE

## 2023-03-28 PROCEDURE — 99222 1ST HOSP IP/OBS MODERATE 55: CPT | Performed by: INTERNAL MEDICINE

## 2023-03-28 PROCEDURE — G8427 DOCREV CUR MEDS BY ELIG CLIN: HCPCS | Performed by: INTERNAL MEDICINE

## 2023-03-30 ENCOUNTER — OUT OF OFFICE VISIT (OUTPATIENT)
Dept: URBAN - METROPOLITAN AREA MEDICAL CENTER 12 | Facility: MEDICAL CENTER | Age: 53
End: 2023-03-30
Payer: COMMERCIAL

## 2023-03-30 DIAGNOSIS — K86.2 CYST OF PANCREAS: ICD-10-CM

## 2023-03-30 DIAGNOSIS — K85.81 OTHER ACUTE PANCREATITIS WITH UNINFECTED NECROSIS: ICD-10-CM

## 2023-03-30 PROCEDURE — 99232 SBSQ HOSP IP/OBS MODERATE 35: CPT | Performed by: INTERNAL MEDICINE

## 2023-04-03 ENCOUNTER — OUT OF OFFICE VISIT (OUTPATIENT)
Dept: URBAN - METROPOLITAN AREA MEDICAL CENTER 12 | Facility: MEDICAL CENTER | Age: 53
End: 2023-04-03
Payer: COMMERCIAL

## 2023-04-03 DIAGNOSIS — K86.2 CYST OF PANCREAS: ICD-10-CM

## 2023-04-03 DIAGNOSIS — K85.81 OTHER ACUTE PANCREATITIS WITH UNINFECTED NECROSIS: ICD-10-CM

## 2023-04-03 PROCEDURE — 99233 SBSQ HOSP IP/OBS HIGH 50: CPT | Performed by: INTERNAL MEDICINE

## 2023-04-04 ENCOUNTER — OUT OF OFFICE VISIT (OUTPATIENT)
Dept: URBAN - METROPOLITAN AREA MEDICAL CENTER 12 | Facility: MEDICAL CENTER | Age: 53
End: 2023-04-04

## 2023-04-04 ENCOUNTER — CLAIMS CREATED FROM THE CLAIM WINDOW (OUTPATIENT)
Dept: URBAN - METROPOLITAN AREA MEDICAL CENTER 12 | Facility: MEDICAL CENTER | Age: 53
End: 2023-04-04
Payer: COMMERCIAL

## 2023-04-04 DIAGNOSIS — K85.81 OTHER ACUTE PANCREATITIS WITH UNINFECTED NECROSIS: ICD-10-CM

## 2023-04-04 DIAGNOSIS — K86.2 CYST OF PANCREAS: ICD-10-CM

## 2023-04-04 PROCEDURE — 99232 SBSQ HOSP IP/OBS MODERATE 35: CPT | Performed by: INTERNAL MEDICINE

## 2023-04-04 PROCEDURE — 99233 SBSQ HOSP IP/OBS HIGH 50: CPT | Performed by: INTERNAL MEDICINE

## 2023-04-08 ENCOUNTER — CLAIMS CREATED FROM THE CLAIM WINDOW (OUTPATIENT)
Dept: URBAN - METROPOLITAN AREA MEDICAL CENTER 12 | Facility: MEDICAL CENTER | Age: 53
End: 2023-04-08
Payer: COMMERCIAL

## 2023-04-08 ENCOUNTER — CLAIMS CREATED FROM THE CLAIM WINDOW (OUTPATIENT)
Dept: URBAN - METROPOLITAN AREA MEDICAL CENTER 12 | Facility: MEDICAL CENTER | Age: 53
End: 2023-04-08

## 2023-04-08 DIAGNOSIS — K86.2 CYST OF PANCREAS: ICD-10-CM

## 2023-04-08 DIAGNOSIS — Z12.11 COLON CANCER SCREENING: ICD-10-CM

## 2023-04-08 DIAGNOSIS — K85.81 OTHER ACUTE PANCREATITIS WITH UNINFECTED NECROSIS: ICD-10-CM

## 2023-04-08 PROCEDURE — 992 NON-BILLABLE: Performed by: STUDENT IN AN ORGANIZED HEALTH CARE EDUCATION/TRAINING PROGRAM

## 2023-04-08 PROCEDURE — 99232 SBSQ HOSP IP/OBS MODERATE 35: CPT | Performed by: INTERNAL MEDICINE

## 2023-04-09 ENCOUNTER — OUT OF OFFICE VISIT (OUTPATIENT)
Dept: URBAN - METROPOLITAN AREA MEDICAL CENTER 12 | Facility: MEDICAL CENTER | Age: 53
End: 2023-04-09

## 2023-04-09 ENCOUNTER — CLAIMS CREATED FROM THE CLAIM WINDOW (OUTPATIENT)
Dept: URBAN - METROPOLITAN AREA MEDICAL CENTER 12 | Facility: MEDICAL CENTER | Age: 53
End: 2023-04-09
Payer: COMMERCIAL

## 2023-04-09 DIAGNOSIS — K86.2 CYST OF PANCREAS: ICD-10-CM

## 2023-04-09 DIAGNOSIS — K85.81 OTHER ACUTE PANCREATITIS WITH UNINFECTED NECROSIS: ICD-10-CM

## 2023-04-09 PROCEDURE — 99232 SBSQ HOSP IP/OBS MODERATE 35: CPT | Performed by: INTERNAL MEDICINE

## 2023-04-10 ENCOUNTER — OUT OF OFFICE VISIT (OUTPATIENT)
Dept: URBAN - METROPOLITAN AREA MEDICAL CENTER 12 | Facility: MEDICAL CENTER | Age: 53
End: 2023-04-10
Payer: COMMERCIAL

## 2023-04-10 DIAGNOSIS — K85.81 OTHER ACUTE PANCREATITIS WITH UNINFECTED NECROSIS: ICD-10-CM

## 2023-04-10 DIAGNOSIS — K86.2 CYST OF PANCREAS: ICD-10-CM

## 2023-04-10 PROCEDURE — 99232 SBSQ HOSP IP/OBS MODERATE 35: CPT | Performed by: INTERNAL MEDICINE

## 2023-04-10 PROCEDURE — 99231 SBSQ HOSP IP/OBS SF/LOW 25: CPT | Performed by: INTERNAL MEDICINE

## 2023-04-11 ENCOUNTER — OUT OF OFFICE VISIT (OUTPATIENT)
Dept: URBAN - METROPOLITAN AREA MEDICAL CENTER 12 | Facility: MEDICAL CENTER | Age: 53
End: 2023-04-11

## 2023-04-11 ENCOUNTER — OFFICE VISIT (OUTPATIENT)
Dept: URBAN - METROPOLITAN AREA CLINIC 86 | Facility: CLINIC | Age: 53
End: 2023-04-11

## 2023-04-27 ENCOUNTER — TELEPHONE ENCOUNTER (OUTPATIENT)
Dept: URBAN - METROPOLITAN AREA CLINIC 92 | Facility: CLINIC | Age: 53
End: 2023-04-27

## 2023-05-01 ENCOUNTER — LAB OUTSIDE AN ENCOUNTER (OUTPATIENT)
Dept: URBAN - METROPOLITAN AREA TELEHEALTH 2 | Facility: TELEHEALTH | Age: 53
End: 2023-05-01

## 2023-05-01 PROBLEM — 235494005: Status: ACTIVE | Noted: 2021-01-28

## 2023-05-02 ENCOUNTER — TELEPHONE ENCOUNTER (OUTPATIENT)
Dept: URBAN - METROPOLITAN AREA CLINIC 86 | Facility: CLINIC | Age: 53
End: 2023-05-02

## 2023-05-03 ENCOUNTER — TELEPHONE ENCOUNTER (OUTPATIENT)
Dept: URBAN - METROPOLITAN AREA CLINIC 92 | Facility: CLINIC | Age: 53
End: 2023-05-03

## 2023-05-04 ENCOUNTER — OFFICE VISIT (OUTPATIENT)
Dept: URBAN - METROPOLITAN AREA TELEHEALTH 2 | Facility: TELEHEALTH | Age: 53
End: 2023-05-04

## 2023-05-04 NOTE — HPI-TODAY'S VISIT:
wt __# over 6wk (was 111)  admitted Pending sale to [facility] last mo w acute on chronic panc c/b fluid collection s/p PEGJ placement by IR  general dentist  Patient has a history of alcoholic cirrhosis  CT scan on 1-3-2023 demonstrated multiple acute peripancreatic fluid collections versus pseudocyst in the upper abdomen differentiation between these 2 etiologies depends on time since pancreatitis onset a MRI was obtained on 3- that demonstrated although difficult to accurately compare due to differences in modality there is decreased active inflammation and free fluid with persistent multiple multiloculated peripancreatic fluid collections with minimal internal complexity likely pseudocyst.  There is severe atrophy and diffuse, irregular beaded appearance of the main pancreatic duct in the region of the body and tail.  This is likely a sequelae of pancreatitis.  It is difficult to exclude ductal disruption in communication with fluid collections on this exam.  There is persistent nonocclusive thrombosis in the extrahepatic portal vein, confluence and splenic vein  He states he has not been able to tolerate any solids or liquids. 50# wt loss over 3mo  He states he has no appetite.  He also has generalized abdominal pain worse in the epigastric area.  Laying down makes the pain feel better.  He is not drinking any alcohol currently but he is still smoking half a pack of cigarettes a day.  He does have issues with acid reflux but he takes omeprazole daily.  He denies any dysphagia or odynophagia.    He is also having diarrhea and sometimes will have loose watery stool every 2 hours. Was rx'ed Creon in the hospital

## 2023-05-23 ENCOUNTER — TELEPHONE ENCOUNTER (OUTPATIENT)
Dept: URBAN - METROPOLITAN AREA CLINIC 92 | Facility: CLINIC | Age: 53
End: 2023-05-23

## 2023-05-25 ENCOUNTER — OFFICE VISIT (OUTPATIENT)
Dept: URBAN - METROPOLITAN AREA TELEHEALTH 2 | Facility: TELEHEALTH | Age: 53
End: 2023-05-25

## 2023-05-25 VITALS — HEIGHT: 65 IN | WEIGHT: 109 LBS | BODY MASS INDEX: 18.16 KG/M2

## 2023-05-25 RX ORDER — PANCRELIPASE 36000; 180000; 114000 [USP'U]/1; [USP'U]/1; [USP'U]/1
TAKE 2 TABS WITH MEALS AND 1 WITH SNACKS CAPSULE, DELAYED RELEASE PELLETS ORAL
Qty: 250 | Refills: 11 | OUTPATIENT
Start: 2023-06-01 | End: 2024-05-26

## 2023-05-26 ENCOUNTER — TELEPHONE ENCOUNTER (OUTPATIENT)
Dept: URBAN - METROPOLITAN AREA CLINIC 92 | Facility: CLINIC | Age: 53
End: 2023-05-26

## 2023-06-06 ENCOUNTER — TELEPHONE ENCOUNTER (OUTPATIENT)
Dept: URBAN - METROPOLITAN AREA CLINIC 92 | Facility: CLINIC | Age: 53
End: 2023-06-06

## 2023-06-28 ENCOUNTER — TELEPHONE ENCOUNTER (OUTPATIENT)
Dept: URBAN - METROPOLITAN AREA CLINIC 92 | Facility: CLINIC | Age: 53
End: 2023-06-28

## 2023-07-20 ENCOUNTER — OFFICE VISIT (OUTPATIENT)
Dept: URBAN - METROPOLITAN AREA TELEHEALTH 2 | Facility: TELEHEALTH | Age: 53
End: 2023-07-20
Payer: COMMERCIAL

## 2023-07-20 VITALS — HEIGHT: 71 IN | WEIGHT: 110 LBS | BODY MASS INDEX: 15.4 KG/M2

## 2023-07-20 DIAGNOSIS — E46 PROTEIN MALNUTRITION: ICD-10-CM

## 2023-07-20 DIAGNOSIS — K86.89 FLUID COLLECTION OF PANCREAS: ICD-10-CM

## 2023-07-20 DIAGNOSIS — K85.90 ACUTE PANCREATITIS: ICD-10-CM

## 2023-07-20 PROBLEM — 238107002: Status: ACTIVE | Noted: 2023-07-20

## 2023-07-20 PROCEDURE — 99213 OFFICE O/P EST LOW 20 MIN: CPT | Performed by: INTERNAL MEDICINE

## 2023-07-20 RX ORDER — PANCRELIPASE 36000; 180000; 114000 [USP'U]/1; [USP'U]/1; [USP'U]/1
TAKE 2 TABS WITH MEALS AND 1 WITH SNACKS CAPSULE, DELAYED RELEASE PELLETS ORAL
Qty: 250 | Refills: 11 | Status: DISCONTINUED | COMMUNITY
Start: 2023-06-01 | End: 2024-05-26

## 2023-07-20 NOTE — HPI-TODAY'S VISIT:
52 male for follow up. stil NPO. tolerating feeds via GJ tube. weight at 112. not taking creon as too expensive. BM are regular

## 2023-10-26 ENCOUNTER — OFFICE VISIT (OUTPATIENT)
Dept: URBAN - METROPOLITAN AREA TELEHEALTH 2 | Facility: TELEHEALTH | Age: 53
End: 2023-10-26
Payer: COMMERCIAL

## 2023-10-26 ENCOUNTER — DASHBOARD ENCOUNTERS (OUTPATIENT)
Age: 53
End: 2023-10-26

## 2023-10-26 VITALS — HEIGHT: 71 IN | WEIGHT: 120 LBS | BODY MASS INDEX: 16.8 KG/M2

## 2023-10-26 DIAGNOSIS — K86.89 FLUID COLLECTION OF PANCREAS: ICD-10-CM

## 2023-10-26 DIAGNOSIS — E46 PROTEIN MALNUTRITION: ICD-10-CM

## 2023-10-26 DIAGNOSIS — K86.1 CHRONIC PANCREATITIS: ICD-10-CM

## 2023-10-26 PROCEDURE — 99213 OFFICE O/P EST LOW 20 MIN: CPT | Performed by: INTERNAL MEDICINE

## 2023-10-26 NOTE — HPI-TODAY'S VISIT:
This a 53-year-old male presents today for follow-up.  He notes that he is doing quite well.  After last encounter he had CT scan which showed near resolution in his fluid collection around the pancreas.  He notes that he continues to use his J-tube for hydration although he is taking his nutrition orally without issue.  No nausea no vomiting.  He has gained 10 pounds.

## 2024-01-17 ENCOUNTER — OFFICE VISIT (OUTPATIENT)
Dept: URBAN - METROPOLITAN AREA TELEHEALTH 2 | Facility: TELEHEALTH | Age: 54
End: 2024-01-17

## 2024-01-17 ENCOUNTER — OFFICE VISIT (OUTPATIENT)
Dept: URBAN - METROPOLITAN AREA CLINIC 86 | Facility: CLINIC | Age: 54
End: 2024-01-17

## 2024-01-17 ENCOUNTER — TELEPHONE ENCOUNTER (OUTPATIENT)
Dept: URBAN - METROPOLITAN AREA CLINIC 86 | Facility: CLINIC | Age: 54
End: 2024-01-17

## 2024-01-17 NOTE — HPI-TODAY'S VISIT:
Patient is a [id number] seen Feb 2023 by Ms Mela and prior visit in 2021 with us that we areasked to see for issues of alcoholic hepatitis and ascites and suspectedcirrhosis.  A copy of the note will be sent to the referringprovider.  Th3/10/23 MRI The liver is showing fatty liver and no aggressivelesions. The gallbladder is unremarkable. There is no significant biliaryductal dilatation. The spleen is normal in size and shows no focalabnormalities. Pancreas with loss of the normal pancreatic architecture withheterogeneous signal on precontrast T1-weighted imaging. There are foci of [id number] signal suspicious for blood products. There is irregular beadeddilation of the pancreatic duct with significant atrophy of the distal body andtail. There are multiple nonenhancing collections coursing throughout the Perrypancreatic space and lesser sac 7 which contains heterogeneous signal and mildinternal complexity. The largest collection extends posteriorly and superiorlyfrom the pancreatic body into the gastrohepatic space measuring approximately5.8 cm in craniocaudal length and 2.8 cm in transverse oblique dimension. Andadditional collection arising from the posterior aspect of the pancreatic bodyand extending to the left of the midline tail measuring approximately 4 x 2 cm.More inferiorly along the pancreatic Uticap process there is a collectionmeasuring 2.1 cm. There is continued. Pancreatic stranding. It is unclearwhether of these communicate with the main duct specifically at the level ofthe body.  Although difficult to accurately compare to priorexam due to difference in modality the predominant findings are grosslyunchanged. There may be decreased free fluid and stranding compared to thatexam. Specifically along the greater curvature of the stomach and surroundingthe liver. Adrenals unremarkable, kidneys unremarkable. Vascular showing athrombus in the extrahepatic portal vein and extending to the portal confluenceand splenic vein. No arterial vesicular findings. No aneurysmal dilatation. Overallthey thought it was difficult to compare to the CT scan but they felt there wasdecreased active inflammation and free fluid with persistent multiple loculatedpancreatic fluid collections and they felt these could be pseudocyst. They sawsevere atrophy and diffuse irregular beaded appearance of the main duct in theregion of the body and the tail which they thought could be a sequela ofpancreatitis. It is difficult to exclude ductal disruption in communicationwith the fluid on this exam. Persistent nonocclusive thrombus in theextrahepatic portal vein, confluence, and splenic vein. Hepatic steatosis. At your visit we referred you to Dr. Sobeida Sandovalfor the clot. Please let us know if you have not made an appointment. This doesnot appear to show cirrhosis and you may not need the endoscopy but still needto consult GI about the pancreas issues and inability to eat. We will review atthe follow up.  Stephanie Plaza PA-C Dear Tru [place], The 2/27/23 Labs were sent to me. It appears thesewere done at [facility] and not [facility] therefore the ammonia level is notaccurate, value 71 which is normal but not arterial blood draw.The glucose 93, creatinine .69, sodium 139,potassium 3.0, calcium 7.9, albumin 2.6, these values are all low. Thebilirubin is 0.9. The alkaline phosphatase is elevated at 232. The ast 35, alt21. The goal for ast/alt is less than 35. The inr is 1.2.MELD score 8. I would recommend eating 2 banannas a day for thenext 3 days and having your primary care recheck the potassium. Next meeting wecan fractionate the alkaline phosphatase and see where the elevation isprimarily coming from. Low vitamin d can effect this. We will review again at the follow up. Pleaseshare this information with your primary care. Stephanie Plaza PA-C 2/27/23 visit, lost to follow up in 2021 and thisis my first time seeing this patient. He is having issues with his pancreatitisand pain and difficulty eating. Also notes difficulty with balance. Loosing aalot of weight due to not being able to eat. He went to the ER and had labsdone and: 2/19/23 inr 1.3, cr 0.7, k 4.0, sodium 136, ast54, alt 33, lipase 176 bili 0.6 alp 191 done at ER visit .previously 12/28/23alp 371, ast 136, alt 57. bili 1. na 137 k 3.6 . ammonia 110 (venous)MELD 9 His PCP ordered a ct scan and was having issuespulling the report so did not have until end of visit. 1/3/23CT ABDOMEN PELVIS WITH CONTRAST AHI CLINICAL INDICATION: Weight loss, diarrhea. TECHNIQUE: Following IV administration of 100 mLOmnipaque 350, CT scan of the abdomen and pelvis withmultiplanar reformatted images generated from thedata set. Dose reduction techniques were utilized. CONTRAST: CT Contrast Omnipaque 350 per mL 100 COMPARISON: No comparison studies are available atthis time.  FINDINGS:  Centrilobular emphysema. 6 mm nodule in thebasilar right lower lobe (series 6 image 14). ABDOMEN: Nonocclusive thrombus in the main portalvein and splenic vein (series 2 image 38). Inflammatorystranding around the pancreas consistent withrecent pancreatitis. Small to moderate amount of free fluidprominently in the right upper quadrant andpelvis. Multiple focal fluid collections in the upper abdomenincluding a 6.0 x 3.5 cm collection adjacent thehepatic hilum (series 2 image 23), a 4.0 x 2.3 cmcollection along the greater curvature of thestomach (series 2 image 25), and a 2.1 x 1.6 cm collectionadjacent the pancreatic tail (series 2 image 30).Multiple other small low-density areas scatteredthroughout the pancreas may represent focaldilatation of the pancreatic duct.  Diffuse hepatic steatosis. Normal spleen. Normaladrenals and kidneys. No hydronephrosis. No bowelobstruction or free air. Mild diverticulosis ofthe descending and sigmoid colon without acutediverticulitis. No abdominal aortic aneurysm. PELVIS: Decompressed bladder. Normal sizedprostate and seminal vesicles. No deep pelvic or inguinallymphadenopathy. No aggressive osseous lesion.  IMPRESSION:  Multiple acute peripancreatic fluid collectionsversus pseudocysts in the upper abdomen. Differentiationbetween these two etiologies depends on time sincepancreatitis onset.  Small to moderate amount of free fluid. Nonocclusive thrombus in the main portal vein andsplenic vein.  Diffuse hepatic steatosis. Emphysema.  6 mm right lower lobe nodule. [facility] recommend 12 month follow-up CT chest. Discussed his hx of cirrhosis and CT not seeingthis and we will get an MRI to get a better look at this and also need betterlook at the clot and the issues with the pancreas. He has been off the ETOH x 2months and encouraged this, recommend counseling as well.Discussed need for hematology evaluation and wewill refer him for this. Needs to see if canidate for anticoagulation   RECAP Pt with hx of etoh cirrhosis and hepatitis withhospitalization for this in 2020 at [place] and at the time bilirubin of over29 and cr of 6. managed In the hospital by the otx team. he was there for 16days and at the time also with pancreatitis and was placed on home hospiceuntil eventually recovered. GI bleed at the time as well. He was seen here [id number] and at the time kidneys had recovered and unforunately he continued theetoh